# Patient Record
Sex: FEMALE | Race: WHITE | NOT HISPANIC OR LATINO | URBAN - METROPOLITAN AREA
[De-identification: names, ages, dates, MRNs, and addresses within clinical notes are randomized per-mention and may not be internally consistent; named-entity substitution may affect disease eponyms.]

---

## 2020-04-30 ENCOUNTER — IMPORTED ENCOUNTER (OUTPATIENT)
Dept: URBAN - METROPOLITAN AREA CLINIC 50 | Facility: CLINIC | Age: 75
End: 2020-04-30

## 2020-05-05 ENCOUNTER — IMPORTED ENCOUNTER (OUTPATIENT)
Dept: URBAN - METROPOLITAN AREA CLINIC 50 | Facility: CLINIC | Age: 75
End: 2020-05-05

## 2020-05-07 ENCOUNTER — IMPORTED ENCOUNTER (OUTPATIENT)
Dept: URBAN - METROPOLITAN AREA CLINIC 50 | Facility: CLINIC | Age: 75
End: 2020-05-07

## 2020-05-11 ENCOUNTER — IMPORTED ENCOUNTER (OUTPATIENT)
Dept: URBAN - METROPOLITAN AREA CLINIC 50 | Facility: CLINIC | Age: 75
End: 2020-05-11

## 2020-05-20 ENCOUNTER — IMPORTED ENCOUNTER (OUTPATIENT)
Dept: URBAN - METROPOLITAN AREA CLINIC 50 | Facility: CLINIC | Age: 75
End: 2020-05-20

## 2020-05-20 NOTE — PATIENT DISCUSSION
"""S/P IOL OD: Tecnis ZCB00 20.5 (ORA) +ORA/Femto/Arcs +Omidria. Continue post operative instructions and drops per schedule.  """

## 2020-05-28 ENCOUNTER — IMPORTED ENCOUNTER (OUTPATIENT)
Dept: URBAN - METROPOLITAN AREA CLINIC 50 | Facility: CLINIC | Age: 75
End: 2020-05-28

## 2020-06-01 ENCOUNTER — IMPORTED ENCOUNTER (OUTPATIENT)
Dept: URBAN - METROPOLITAN AREA CLINIC 50 | Facility: CLINIC | Age: 75
End: 2020-06-01

## 2020-06-03 ENCOUNTER — IMPORTED ENCOUNTER (OUTPATIENT)
Dept: URBAN - METROPOLITAN AREA CLINIC 50 | Facility: CLINIC | Age: 75
End: 2020-06-03

## 2020-06-03 NOTE — PATIENT DISCUSSION
"""S/P IOL OS: Tecnis ZCB00 20.5 +Femto/Arcs +Omidria. Continue post operative instructions and drops per schedule.  """

## 2020-06-11 ENCOUNTER — IMPORTED ENCOUNTER (OUTPATIENT)
Dept: URBAN - METROPOLITAN AREA CLINIC 50 | Facility: CLINIC | Age: 75
End: 2020-06-11

## 2020-07-09 ENCOUNTER — IMPORTED ENCOUNTER (OUTPATIENT)
Dept: URBAN - METROPOLITAN AREA CLINIC 50 | Facility: CLINIC | Age: 75
End: 2020-07-09

## 2020-12-17 ENCOUNTER — IMPORTED ENCOUNTER (OUTPATIENT)
Dept: URBAN - METROPOLITAN AREA CLINIC 50 | Facility: CLINIC | Age: 75
End: 2020-12-17

## 2021-04-17 ASSESSMENT — TONOMETRY
OS_IOP_MMHG: 12
OD_IOP_MMHG: 13
OS_IOP_MMHG: 14
OD_IOP_MMHG: 12
OD_IOP_MMHG: 14
OS_IOP_MMHG: 14
OS_IOP_MMHG: 12
OS_IOP_MMHG: 29
OS_IOP_MMHG: 12
OD_IOP_MMHG: 25
OS_IOP_MMHG: 10
OD_IOP_MMHG: 10
OD_IOP_MMHG: 12
OD_IOP_MMHG: 13

## 2021-04-17 ASSESSMENT — VISUAL ACUITY
OS_BAT: 20/40
OD_CC: J1@ 16 IN
OS_BAT: 20/30
OS_OTHER: 20/30. 20/30.
OD_SC: 20/20
OS_SC: 20/30
OD_OTHER: 20/40. 20/50.
OD_SC: 20/40
OD_OTHER: 20/40. 20/80.
OS_CC: 20/30
OD_OTHER: 20/40. 20/80.
OS_CC: J1@ 14 IN
OS_SC: 20/20
OD_SC: 20/20
OS_BAT: 20/30
OD_SC: 20/20
OS_OTHER: 20/20-. 20/50.
OD_BAT: 20/40
OD_OTHER: 20/20-. 20/50.
OS_SC: 20/20
OD_CC: 20/80
OS_SC: 20/30
OS_BAT: 20/20-
OD_CC: 20/70
OS_OTHER: 20/30. 20/30.
OS_OTHER: 20/40. 20/60.
OS_CC: J1@ 16 IN
OD_SC: 20/70
OD_BAT: 20/20-
OS_PH: @ 16 IN
OS_OTHER: 20/30. 20/50.
OD_PH: @ 16 IN
OD_PH: 20/40
OD_BAT: 20/40
OS_BAT: 20/30
OD_CC: 20/70
OS_CC: 20/50-
OS_CC: 20/30
OD_CC: J1@ 14 IN
OD_BAT: 20/40